# Patient Record
Sex: MALE
[De-identification: names, ages, dates, MRNs, and addresses within clinical notes are randomized per-mention and may not be internally consistent; named-entity substitution may affect disease eponyms.]

---

## 2018-11-13 ENCOUNTER — HOSPITAL ENCOUNTER (OUTPATIENT)
Dept: HOSPITAL 39 - GMAL | Age: 43
End: 2018-11-13
Attending: FAMILY MEDICINE
Payer: COMMERCIAL

## 2018-11-13 DIAGNOSIS — E55.9: ICD-10-CM

## 2018-11-13 DIAGNOSIS — D51.3: Primary | ICD-10-CM

## 2019-12-04 ENCOUNTER — HOSPITAL ENCOUNTER (OUTPATIENT)
Dept: HOSPITAL 39 - GMAL | Age: 44
End: 2019-12-04
Attending: FAMILY MEDICINE
Payer: COMMERCIAL

## 2019-12-04 DIAGNOSIS — Z00.01: Primary | ICD-10-CM

## 2019-12-20 ENCOUNTER — HOSPITAL ENCOUNTER (OUTPATIENT)
Dept: HOSPITAL 39 - CT | Age: 44
End: 2019-12-20
Attending: FAMILY MEDICINE
Payer: COMMERCIAL

## 2019-12-20 DIAGNOSIS — R31.21: Primary | ICD-10-CM

## 2019-12-20 DIAGNOSIS — N28.1: ICD-10-CM

## 2019-12-22 NOTE — CT
EXAM: Abdomen/Pelvis w/wo Contrast



CLINICAL HISTORY: ASYMPTOMATIC MICROSCOPIC HEMATURIA



COMPARISON STUDY: None

 

TECHNICAL:  Pre and post IV contrast images were performed

through the abdomen and pelvis. Delayed excretion phase urogram

images were acquired.

 Sagittal and coronal reconstructions were obtained.



FINDINGS:  The visible portion of the chest is negative.  The

heart is not enlarged.  



Noncontrast images demonstrate no urinary calculi. After IV

contrast administration, there are no enhancing abnormalities

within the renal parenchyma. Very well-circumscribed low

densities within the renal parenchyma are consistent with cysts.

The left lower pole renal cyst measures 3.2 x 3.2 cm and has some

mass effect on the left lower pole collecting system. The

excretion phase images demonstrate contrast in nondilated

collecting systems. The ureters are segmentally seen and

nondilated. The visible portion of the urinary bladder is

unremarkable.

 



The liver, spleen, pancreas, and adrenal glands, enhance

appropriately and demonstrate no acute abnormality.  The

gallbladder is intact and there is no evidence of biliary

dilatation.  



 

There is no bowel obstruction or free air.  There is no acute

inflammatory process.  The appendix is visible and normal.  

 

The aorta, IVC and retroperitoneum are negative.  

 

Structures within the pelvis are negative. The visible osseous

structures are negative.  

 

IMPRESSION: 

1. A 3.2 cm left lower pole renal cyst has mass effect upon the

lower pole collecting system and it is uncertain if this would

result in hematuria.

2. No enhancing renal mass, urinary calculi or visible bladder

abnormality.





This exam was performed according to our departmental

dose-optimization program, which includes automated exposure

control, adjustment of the mA and/or kV according to patient size

and/or use of iterative reconstruction technique.



Electronically signed by:  Julito House MD  12/22/2019 11:01 AM

[facility] Workstation: [id number]

## 2020-01-22 NOTE — RAD
EXAM DESCRIPTION: Chest,2 Views



CLINICAL HISTORY: HX of smoking, having surgery



COMPARISON: None



TECHNIQUE: PA/lateral



FINDINGS: There is no acute appearing cardiac or pulmonary

abnormality. Heart size is normal with normal pulmonary

vascularity. No pleural effusion or pneumothorax. Lungs are clear

with no consolidating infiltrate. Lateral view shows intact

sternum and T-spine.



IMPRESSION:



No acute process is identified in the chest.



Electronically signed by:  Richard Patel MD  1/22/2020 3:54

[facility] Workstation: [id number]

## 2020-01-24 ENCOUNTER — HOSPITAL ENCOUNTER (OUTPATIENT)
Dept: HOSPITAL 39 - AMB | Age: 45
Discharge: HOME | End: 2020-01-24
Attending: SPECIALIST
Payer: COMMERCIAL

## 2020-01-24 VITALS — OXYGEN SATURATION: 97 %

## 2020-01-24 DIAGNOSIS — Z88.5: ICD-10-CM

## 2020-01-24 DIAGNOSIS — K60.3: Primary | ICD-10-CM

## 2020-01-24 DIAGNOSIS — Z79.899: ICD-10-CM

## 2020-01-24 DIAGNOSIS — I10: ICD-10-CM

## 2020-01-24 PROCEDURE — 94640 AIRWAY INHALATION TREATMENT: CPT

## 2020-01-24 PROCEDURE — 85014 HEMATOCRIT: CPT

## 2020-01-24 PROCEDURE — 46270 REMOVE ANAL FIST SUBQ: CPT

## 2020-01-24 PROCEDURE — 85018 HEMOGLOBIN: CPT

## 2020-01-24 PROCEDURE — 00902 ANES ANORECTAL PX: CPT

## 2020-01-24 PROCEDURE — 71046 X-RAY EXAM CHEST 2 VIEWS: CPT

## 2020-01-24 PROCEDURE — 80048 BASIC METABOLIC PNL TOTAL CA: CPT

## 2020-01-24 PROCEDURE — 93005 ELECTROCARDIOGRAM TRACING: CPT

## 2020-01-24 NOTE — OP
DATE OF PROCEDURE:  01/24/20



PREOPERATIVE DIAGNOSIS: 

1.  Perianal fistula into the scrotum.



POSTOPERATIVE DIAGNOSIS: 

1.  Perianal fistula into the scrotum.



PROCEDURE: 

1.  Perianal scrotal fistulotomy.  



SURGEON:  Jim Holland MD.



ANESTHESIA: General and local.



FINDINGS:  The opening was in the perineum approximately 5 cm from the anal 
opening.  No affected internal glands were identified.  It was a well-formed 
fistula, approximately 1 to 1.5 cm in circumference.  It did end in the mid 
lower third of the scrotum.  Portions were excised for pathology.  A complete 
open fistulotomy was performed here.  There was no evidence of additional tracts
seen and certainly no evidence of that it went to or was part of the ureter.



COMPLICATIONS:  None.



ESTIMATED BLOOD LOSS:  Minimal.  



SPECIMEN:  Portion of fistula.



PLAN:  Discharge.



INDICATION:  The patient has had drainage in this area for years.  He presented.
 I could see a drainage tract during examination.  I felt the cord going up into
his right sided scrotum.  He was consented for fistulotomy understanding it 
often is involving an internal anal fistula and possibly a need for fistulotomy 
here which could affect anal function, but very rarely.



PROCEDURE:  In prone jackknife position, he was prepped and draped in sterile 
fashion.  Cheeks were taped apart.  Upon pressure of the palpable cord, we did 
get fluid out at the perineum at the external opening, however, it was so small 
we could not even get the probe in initially.  There was another small dimple 
nearby, but this was blind and did not go anywhere.  Digital rectal exam did not
reveal any evidence of tract to the anal canal.  On speculum exam, there was no 
evidence of affected internal anal glands.  He had some internal hemorrhoids, 
but nothing significant at this time.  I opened that external opening a little 
bit in order to get down and when I was confident where the fistula was, we 
opened the skin, cut down to the tract.  I made a small opening in it and now I 
was able to easily insert the probe along this well-formed fistula tract.  It 
was essentially a tube.  It was opened above the probe to its full length.  
Again, a portion of it was removed for pathology, but it was felt it will help 
granulation and healing.  I did remove the infected granulation tissue.  In 
manipulating after we were done, there was no evidence of any additional areas 
of involvement.  No fluid coming, no drainage and, again, no evidence of that it
was a fistula up to the urethra which I felt near the base of the penis and was 
far away from where we were operating at this point.  There was good hemostasis.
 A lot of local anesthesia was placed.  The patient tolerated the procedure.  I 
discussed with his wife changing the gauze daily and okay to get it wet with 
showers and it will take weeks to heal, but this should take care of the 
problem.  He was awakened and taken to Recovery to be discharged.



#[id number]

[facility]

## 2020-01-30 VITALS — TEMPERATURE: 97.5 F | SYSTOLIC BLOOD PRESSURE: 130 MMHG | DIASTOLIC BLOOD PRESSURE: 84 MMHG
